# Patient Record
Sex: MALE
[De-identification: names, ages, dates, MRNs, and addresses within clinical notes are randomized per-mention and may not be internally consistent; named-entity substitution may affect disease eponyms.]

---

## 2022-07-18 PROBLEM — Z00.00 ENCOUNTER FOR PREVENTIVE HEALTH EXAMINATION: Status: ACTIVE | Noted: 2022-07-18

## 2022-07-27 ENCOUNTER — APPOINTMENT (OUTPATIENT)
Dept: ORTHOPEDIC SURGERY | Facility: CLINIC | Age: 57
End: 2022-07-27

## 2022-07-27 VITALS — BODY MASS INDEX: 36.45 KG/M2 | WEIGHT: 315 LBS | HEIGHT: 78 IN

## 2022-07-27 DIAGNOSIS — Z82.49 FAMILY HISTORY OF ISCHEMIC HEART DISEASE AND OTHER DISEASES OF THE CIRCULATORY SYSTEM: ICD-10-CM

## 2022-07-27 DIAGNOSIS — Z86.39 PERSONAL HISTORY OF OTHER ENDOCRINE, NUTRITIONAL AND METABOLIC DISEASE: ICD-10-CM

## 2022-07-27 DIAGNOSIS — Z83.3 FAMILY HISTORY OF DIABETES MELLITUS: ICD-10-CM

## 2022-07-27 DIAGNOSIS — M25.561 PAIN IN RIGHT KNEE: ICD-10-CM

## 2022-07-27 DIAGNOSIS — M25.562 PAIN IN RIGHT KNEE: ICD-10-CM

## 2022-07-27 DIAGNOSIS — Z78.9 OTHER SPECIFIED HEALTH STATUS: ICD-10-CM

## 2022-07-27 DIAGNOSIS — Z86.718 PERSONAL HISTORY OF OTHER VENOUS THROMBOSIS AND EMBOLISM: ICD-10-CM

## 2022-07-27 DIAGNOSIS — Z86.79 PERSONAL HISTORY OF OTHER DISEASES OF THE CIRCULATORY SYSTEM: ICD-10-CM

## 2022-07-27 DIAGNOSIS — R73.03 PREDIABETES.: ICD-10-CM

## 2022-07-27 DIAGNOSIS — Z72.3 LACK OF PHYSICAL EXERCISE: ICD-10-CM

## 2022-07-27 PROCEDURE — 20610 DRAIN/INJ JOINT/BURSA W/O US: CPT | Mod: 50,59

## 2022-07-27 PROCEDURE — 99203 OFFICE O/P NEW LOW 30 MIN: CPT | Mod: 25

## 2022-07-27 RX ORDER — ALLOPURINOL 300 MG/1
300 TABLET ORAL
Qty: 90 | Refills: 0 | Status: ACTIVE | COMMUNITY
Start: 2022-06-23

## 2022-07-27 RX ORDER — METOPROLOL TARTRATE 50 MG/1
50 TABLET, FILM COATED ORAL
Qty: 90 | Refills: 0 | Status: ACTIVE | COMMUNITY
Start: 2022-06-23

## 2022-07-27 RX ORDER — APIXABAN 5 MG/1
5 TABLET, FILM COATED ORAL
Qty: 180 | Refills: 0 | Status: ACTIVE | COMMUNITY
Start: 2022-04-28

## 2022-07-27 RX ORDER — CARVEDILOL 12.5 MG/1
12.5 TABLET, FILM COATED ORAL
Qty: 180 | Refills: 0 | Status: ACTIVE | COMMUNITY
Start: 2022-04-28

## 2022-07-27 RX ORDER — AMOXICILLIN 500 MG/1
500 TABLET, FILM COATED ORAL
Qty: 21 | Refills: 0 | Status: COMPLETED | COMMUNITY
Start: 2022-03-16

## 2022-07-27 RX ORDER — METHOCARBAMOL 750 MG/1
750 TABLET, FILM COATED ORAL
Qty: 30 | Refills: 0 | Status: ACTIVE | COMMUNITY
Start: 2022-06-23

## 2022-07-27 RX ORDER — AMLODIPINE BESYLATE 10 MG/1
10 TABLET ORAL
Qty: 90 | Refills: 0 | Status: ACTIVE | COMMUNITY
Start: 2022-04-28

## 2022-07-27 RX ORDER — ATORVASTATIN CALCIUM 20 MG/1
20 TABLET, FILM COATED ORAL
Qty: 90 | Refills: 0 | Status: ACTIVE | COMMUNITY
Start: 2022-06-23

## 2022-07-27 RX ORDER — HYDROCHLOROTHIAZIDE 25 MG/1
25 TABLET ORAL
Qty: 90 | Refills: 0 | Status: COMPLETED | COMMUNITY
Start: 2022-04-13

## 2022-07-27 RX ORDER — METFORMIN HYDROCHLORIDE 500 MG/1
500 TABLET, COATED ORAL
Qty: 30 | Refills: 0 | Status: ACTIVE | COMMUNITY
Start: 2022-04-28

## 2022-07-27 RX ORDER — IBUPROFEN 800 MG/1
800 TABLET, FILM COATED ORAL
Qty: 20 | Refills: 0 | Status: COMPLETED | COMMUNITY
Start: 2022-03-16

## 2022-07-27 RX ORDER — NAPROXEN 500 MG/1
500 TABLET, DELAYED RELEASE ORAL
Qty: 30 | Refills: 0 | Status: ACTIVE | COMMUNITY
Start: 2022-06-23

## 2022-07-27 NOTE — ASSESSMENT
[FreeTextEntry1] : 57 y/o with bilateral knee pain for the last few months variable but can be severe.  X-rays show moderately severe osteoarthritis greatest in the medial compartments of both knees.\par I discussed the diagnosis and treatment options.\par I explained how we cannot cure the arthritis but we can try to manage the pain.  If when the arthritis becomes severe then knee replacement may be considered.\par Right now I would recommend trying physical therapy and he was given home exercises to strengthen the muscles around the knee.\par Weight loss can be helpful to get stress of the knees.\par He can take Tylenol as needed and use topical ointments or patches for pain.  Since he is on Eliquis he should not be taking NSAIDs because there is an increased risk of bleeding.  He can talk to his doctor about that.  Sometimes they will allow an occasional Celebrex which does not affect platelets.\par I offered a steroid injection which she did not want to try today given the pain and hopefully will give him some relief.  If he does not get relief with all of these treatments then we may try hyaluronic acid injections.\par He will follow-up in about 7 to 8 weeks.

## 2022-07-27 NOTE — PHYSICAL EXAM
[LE] : Sensory: Intact in bilateral lower extremities [Normal RLE] : Right Lower Extremity: No scars, rashes, lesions, ulcers, skin intact [Normal LLE] : Left Lower Extremity: No scars, rashes, lesions, ulcers, skin intact [Normal Touch] : sensation intact for touch [Normal] : Oriented to person, place, and time, insight and judgement were intact and the affect was normal [de-identified] : Bilateral knees\par Mildly antalgic gait.\par Normal alignment.\par No effusion.  No erythema, warmth, ecchymoses.  Skin is intact.  Midline tender medial joint line bilateral knees and less tender at the patella facets.  No lateral joint line tenderness.\par Negative Allie.\par Intact extensor mechanism.\par Ia Lachman.  Negative anterior and posterior drawer and normal varus and valgus laxity.\par Normal neurovascular exam [de-identified] : Overweight [de-identified] : \par \par X-rays of bilateral knees done at [facility] on 7/6/22 3 views showed no evidence of acute fracture. No subluxation. Mild bilateral genu varum. Moderate medial and mild to moderate PF compartment OA bilaterally. Lateral compartment joint spaces are preserved. Scattered atherosclerotic vascular calcifications in the posterior thighs bilaterally. No aggressive lytic or blastic osseous lesion.

## 2022-07-27 NOTE — HISTORY OF PRESENT ILLNESS
[de-identified] : Mr. Bates is a 56 year old male who comes in for evaluation for bilateral knee pain that started about a month ago with no injury.  No prior knee injuries either.  Pain is worse with walking and standing and when he first gets up from bed or in the morning.  No swelling or locking or buckling.\par Pain is 8/10 that he describes as sharp better with rest. 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 \par He had x-rays done on 7/6/22 ordered by his PCP who also prescribed Naproxen and methocarbamol.  He also tried ibuprofen 800 mg tablets.  He tried Tylenol but that does not help at all.  He would like to get more relief.  He is on Eliquis.\par He has no history of knee injuries.

## 2022-07-27 NOTE — PROCEDURE
[Injection] : Injection [Bilateral] : of bilateral [Knee Joint] : knee joint [Inflammation] : Inflammation [Patient] : patient [Risk] : risk [Benefits] : benefits [Alternatives] : alternatives [Bleeding] : bleeding [Infection] : infection [Allergic Reaction] : allergic reaction [Verbal Consent Obtained] : verbal consent was obtained prior to the procedure [Alcohol] : Alcohol [Ethyl Chloride Spray] : ethyl chloride spray was used as a topical anesthetic [Lateral] : lateral [1% Lidocaine___(mL)] : [unfilled] mL of 1% Lidocaine [Triamcinolone 40mg/mL___(mL)] : [unfilled] ~UmL of 40mg/mL triamcinolone [Bandage Applied] : a bandage [Tolerated Well] : The patient tolerated the procedure well [None] : none [No Strenuous Activity___day(s)] : avoid strenuous activity for [unfilled] day(s) [Anterior] : anterior [22] : a 22-gauge [FreeTextEntry1] : ChloraPrep

## 2022-09-13 PROBLEM — M17.0 PRIMARY OSTEOARTHRITIS OF BOTH KNEES: Status: ACTIVE | Noted: 2022-07-27

## 2022-09-13 NOTE — PHYSICAL EXAM
[LE] : Sensory: Intact in bilateral lower extremities [Normal RLE] : Right Lower Extremity: No scars, rashes, lesions, ulcers, skin intact [Normal LLE] : Left Lower Extremity: No scars, rashes, lesions, ulcers, skin intact [Normal Touch] : sensation intact for touch [Normal] : Oriented to person, place, and time, insight and judgement were intact and the affect was normal [de-identified] : Bilateral knees\par Mildly antalgic gait.\par Normal alignment.\par No effusion.  No erythema, warmth, ecchymoses.  Skin is intact.  Midline tender medial joint line bilateral knees and less tender at the patella facets.  No lateral joint line tenderness.\par Negative Allie.\par Intact extensor mechanism.\par Ia Lachman.  Negative anterior and posterior drawer and normal varus and valgus laxity.\par Normal neurovascular exam [de-identified] : Overweight [de-identified] : \par \par X-rays taken at [facility] on 7/6/22 3 views showed no evidence of acute fracture. No subluxation. Mild bilateral genu varum. Moderate medial and mild to moderate PF compartment OA bilaterally. Lateral compartment joint spaces are preserved. Scattered atherosclerotic vascular calcifications in the posterior thighs bilaterally. No aggressive lytic or blastic osseous lesion. \par

## 2022-09-13 NOTE — HISTORY OF PRESENT ILLNESS
[de-identified] : Mr. Bates is a 56 year old male who comes in for follow up for bilateral knee pain that started about 2.5 months ago\par Last visit we did bilateral steroid injections that has\par He started PT\par He cant take NSAIDs

## 2022-09-13 NOTE — ASSESSMENT
[FreeTextEntry1] : 57 y/o with bilateral knee pain w/ moderately severe osteoarthritis greatest in the medial compartments of both knees.\par I discussed the diagnosis and treatment options.

## 2022-09-14 ENCOUNTER — APPOINTMENT (OUTPATIENT)
Dept: ORTHOPEDIC SURGERY | Facility: CLINIC | Age: 57
End: 2022-09-14

## 2022-09-14 DIAGNOSIS — M17.0 BILATERAL PRIMARY OSTEOARTHRITIS OF KNEE: ICD-10-CM
